# Patient Record
Sex: MALE | Race: OTHER | URBAN - METROPOLITAN AREA
[De-identification: names, ages, dates, MRNs, and addresses within clinical notes are randomized per-mention and may not be internally consistent; named-entity substitution may affect disease eponyms.]

---

## 2024-01-10 ENCOUNTER — OFFICE VISIT (OUTPATIENT)
Dept: FAMILY MEDICINE CLINIC | Facility: CLINIC | Age: 24
End: 2024-01-10

## 2024-01-10 VITALS
OXYGEN SATURATION: 97 % | HEART RATE: 89 BPM | DIASTOLIC BLOOD PRESSURE: 68 MMHG | RESPIRATION RATE: 16 BRPM | WEIGHT: 159 LBS | HEIGHT: 69 IN | BODY MASS INDEX: 23.55 KG/M2 | SYSTOLIC BLOOD PRESSURE: 112 MMHG | TEMPERATURE: 98 F

## 2024-01-10 DIAGNOSIS — Z76.89 RETURN TO WORK EXAM: Primary | ICD-10-CM

## 2024-01-10 NOTE — PROGRESS NOTES
CHIEF COMPLAINT:     Chief Complaint   Patient presents with    Note For Work     Missed work 2 wks ago on a Friday 12/29/23  Requesting note   Was having productive cough, felt feverish, chills        HPI:   Garrick Matthews is a 23 year old male who presents for return to work note. Patient reports he had felt fever, chills and cough 2 weeks ago, started on 12/29. Unable to say when symptoms resolved but he denies any symptoms at this time.     No current outpatient medications on file.      No past medical history on file.   Social History:  Social History     Tobacco Use    Smoking status: Never    Smokeless tobacco: Never        REVIEW OF SYSTEMS:   GENERAL: see HPI  HENT: Denies congestion, rhinorrhea, sore throat or ear pain  CHEST: Denies chest pain, or palpitations  LUNGS: Denies shortness of breath, cough, or wheezing          EXAM:   /68   Pulse 89   Temp 98 °F (36.7 °C)   Resp 16   Ht 5' 9\" (1.753 m)   Wt 159 lb (72.1 kg)   SpO2 97%   BMI 23.48 kg/m²   GENERAL:well nourished,in no apparent distress  SKIN: no rashes,no suspicious lesions  HEAD: atraumatic, normocephalic  EYES: conjunctiva clear  THROAT: oral mucosa pink, moist. No visible dental caries. Posterior pharynx is not erythematous. no exudates.  NECK: supple, non-tender  LUNGS: clear to auscultation bilaterally. Breathing is non labored.  CARDIO: RRR without murmur  PSYCH: inappropriate affect      ASSESSMENT AND PLAN:     ASSESSMENT:  Encounter Diagnosis   Name Primary?    Return to work exam Yes       PLAN:  Work note provided  No q/c.

## (undated) NOTE — LETTER
Date: 1/10/2024    Patient Name: Garrick Matthews          To Whom it may concern:    This letter has been written at the patient's request. The above patient was seen at the [facility] for work clearance.   Seen today 1/10/23.  Reports sick/illness period starting on 12/29.    The patient may return to work 1/11/23 without limitations.         Sincerely,    BRANDEN Hodges